# Patient Record
Sex: FEMALE | Race: WHITE | NOT HISPANIC OR LATINO | URBAN - METROPOLITAN AREA
[De-identification: names, ages, dates, MRNs, and addresses within clinical notes are randomized per-mention and may not be internally consistent; named-entity substitution may affect disease eponyms.]

---

## 2017-06-20 ENCOUNTER — INPATIENT (INPATIENT)
Facility: HOSPITAL | Age: 72
LOS: 8 days | Discharge: HOME | End: 2017-06-29
Attending: INTERNAL MEDICINE

## 2017-06-20 DIAGNOSIS — R20.9 UNSPECIFIED DISTURBANCES OF SKIN SENSATION: ICD-10-CM

## 2017-06-28 PROBLEM — Z00.00 ENCOUNTER FOR PREVENTIVE HEALTH EXAMINATION: Status: ACTIVE | Noted: 2017-06-28

## 2017-07-05 DIAGNOSIS — Z83.3 FAMILY HISTORY OF DIABETES MELLITUS: ICD-10-CM

## 2017-07-05 DIAGNOSIS — I97.89 OTHER POSTPROCEDURAL COMPLICATIONS AND DISORDERS OF THE CIRCULATORY SYSTEM, NOT ELSEWHERE CLASSIFIED: ICD-10-CM

## 2017-07-05 DIAGNOSIS — I49.5 SICK SINUS SYNDROME: ICD-10-CM

## 2017-07-05 DIAGNOSIS — Z88.0 ALLERGY STATUS TO PENICILLIN: ICD-10-CM

## 2017-07-05 DIAGNOSIS — I74.3 EMBOLISM AND THROMBOSIS OF ARTERIES OF THE LOWER EXTREMITIES: ICD-10-CM

## 2017-07-05 DIAGNOSIS — I48.0 PAROXYSMAL ATRIAL FIBRILLATION: ICD-10-CM

## 2017-07-05 DIAGNOSIS — E87.6 HYPOKALEMIA: ICD-10-CM
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
## 2017-07-05 DIAGNOSIS — Z96.651 PRESENCE OF RIGHT ARTIFICIAL KNEE JOINT: ICD-10-CM

## 2017-07-05 DIAGNOSIS — Z82.49 FAMILY HISTORY OF ISCHEMIC HEART DISEASE AND OTHER DISEASES OF THE CIRCULATORY SYSTEM: ICD-10-CM

## 2017-07-05 DIAGNOSIS — E78.00 PURE HYPERCHOLESTEROLEMIA, UNSPECIFIED: ICD-10-CM

## 2017-07-05 DIAGNOSIS — D62 ACUTE POSTHEMORRHAGIC ANEMIA: ICD-10-CM

## 2017-07-05 DIAGNOSIS — I51.3 INTRACARDIAC THROMBOSIS, NOT ELSEWHERE CLASSIFIED: ICD-10-CM

## 2017-07-05 DIAGNOSIS — Z88.1 ALLERGY STATUS TO OTHER ANTIBIOTIC AGENTS STATUS: ICD-10-CM

## 2017-07-05 DIAGNOSIS — E66.9 OBESITY, UNSPECIFIED: ICD-10-CM

## 2017-07-06 DIAGNOSIS — I97.191 OTHER POSTPROCEDURAL CARDIAC FUNCTIONAL DISTURBANCES FOLLOWING OTHER SURGERY: ICD-10-CM

## 2017-07-17 ENCOUNTER — APPOINTMENT (OUTPATIENT)
Dept: CARDIOLOGY | Facility: CLINIC | Age: 72
End: 2017-07-17

## 2017-07-17 VITALS
WEIGHT: 190 LBS | HEIGHT: 62 IN | BODY MASS INDEX: 34.96 KG/M2 | SYSTOLIC BLOOD PRESSURE: 130 MMHG | DIASTOLIC BLOOD PRESSURE: 72 MMHG

## 2017-07-17 DIAGNOSIS — I10 ESSENTIAL (PRIMARY) HYPERTENSION: ICD-10-CM

## 2017-07-17 DIAGNOSIS — F15.90 OTHER STIMULANT USE, UNSPECIFIED, UNCOMPLICATED: ICD-10-CM

## 2017-07-17 DIAGNOSIS — Z87.891 PERSONAL HISTORY OF NICOTINE DEPENDENCE: ICD-10-CM

## 2017-07-17 DIAGNOSIS — Z80.9 FAMILY HISTORY OF MALIGNANT NEOPLASM, UNSPECIFIED: ICD-10-CM

## 2017-07-17 DIAGNOSIS — I48.0 PAROXYSMAL ATRIAL FIBRILLATION: ICD-10-CM

## 2017-07-17 DIAGNOSIS — Z82.49 FAMILY HISTORY OF ISCHEMIC HEART DISEASE AND OTHER DISEASES OF THE CIRCULATORY SYSTEM: ICD-10-CM

## 2017-07-17 DIAGNOSIS — Z83.3 FAMILY HISTORY OF DIABETES MELLITUS: ICD-10-CM

## 2017-07-17 RX ORDER — DRONEDARONE 400 MG/1
400 TABLET, FILM COATED ORAL TWICE DAILY
Qty: 60 | Refills: 2 | Status: ACTIVE | COMMUNITY
Start: 2017-07-17 | End: 1900-01-01

## 2017-07-17 RX ORDER — AMIODARONE HYDROCHLORIDE 200 MG/1
200 TABLET ORAL
Refills: 0 | Status: DISCONTINUED | COMMUNITY
End: 2017-07-17

## 2017-07-17 RX ORDER — PANTOPRAZOLE 40 MG/1
40 TABLET, DELAYED RELEASE ORAL DAILY
Refills: 0 | Status: ACTIVE | COMMUNITY

## 2017-07-17 RX ORDER — TRIAMTERENE AND HYDROCHLOROTHIAZIDE 37.5; 25 MG/1; MG/1
37.5-25 CAPSULE ORAL DAILY
Refills: 0 | Status: ACTIVE | COMMUNITY

## 2017-07-17 RX ORDER — DILTIAZEM HYDROCHLORIDE 60 MG/1
60 TABLET ORAL TWICE DAILY
Refills: 0 | Status: ACTIVE | COMMUNITY

## 2017-07-17 RX ORDER — RIVAROXABAN 15 MG/1
15 TABLET, FILM COATED ORAL TWICE DAILY
Refills: 0 | Status: ACTIVE | COMMUNITY

## 2017-09-25 ENCOUNTER — APPOINTMENT (OUTPATIENT)
Dept: CARDIOLOGY | Facility: CLINIC | Age: 72
End: 2017-09-25

## 2018-01-14 ENCOUNTER — EMERGENCY (EMERGENCY)
Facility: HOSPITAL | Age: 73
LOS: 0 days | Discharge: HOME | End: 2018-01-14

## 2018-01-14 DIAGNOSIS — Z86.718 PERSONAL HISTORY OF OTHER VENOUS THROMBOSIS AND EMBOLISM: ICD-10-CM

## 2018-01-14 DIAGNOSIS — H55.00 UNSPECIFIED NYSTAGMUS: ICD-10-CM

## 2018-01-14 DIAGNOSIS — I48.91 UNSPECIFIED ATRIAL FIBRILLATION: ICD-10-CM

## 2018-01-14 DIAGNOSIS — Z79.899 OTHER LONG TERM (CURRENT) DRUG THERAPY: ICD-10-CM

## 2018-01-14 DIAGNOSIS — R42 DIZZINESS AND GIDDINESS: ICD-10-CM

## 2018-01-14 DIAGNOSIS — E78.00 PURE HYPERCHOLESTEROLEMIA, UNSPECIFIED: ICD-10-CM

## 2018-01-14 DIAGNOSIS — R20.9 UNSPECIFIED DISTURBANCES OF SKIN SENSATION: ICD-10-CM

## 2018-01-14 DIAGNOSIS — E86.0 DEHYDRATION: ICD-10-CM

## 2018-01-14 DIAGNOSIS — Z79.01 LONG TERM (CURRENT) USE OF ANTICOAGULANTS: ICD-10-CM

## 2018-05-20 ENCOUNTER — INPATIENT (INPATIENT)
Facility: HOSPITAL | Age: 73
LOS: 3 days | Discharge: HOME | End: 2018-05-24
Attending: INTERNAL MEDICINE | Admitting: INTERNAL MEDICINE

## 2018-05-20 VITALS
SYSTOLIC BLOOD PRESSURE: 181 MMHG | RESPIRATION RATE: 20 BRPM | HEART RATE: 57 BPM | OXYGEN SATURATION: 98 % | TEMPERATURE: 96 F | DIASTOLIC BLOOD PRESSURE: 75 MMHG

## 2018-05-20 DIAGNOSIS — Z98.890 OTHER SPECIFIED POSTPROCEDURAL STATES: Chronic | ICD-10-CM

## 2018-05-20 LAB
ALBUMIN SERPL ELPH-MCNC: 3.9 G/DL — SIGNIFICANT CHANGE UP (ref 3.5–5.2)
ALP SERPL-CCNC: 188 U/L — HIGH (ref 30–115)
ALT FLD-CCNC: 32 U/L — SIGNIFICANT CHANGE UP (ref 0–41)
ANION GAP SERPL CALC-SCNC: 17 MMOL/L — HIGH (ref 7–14)
APTT BLD: 34.1 SEC — SIGNIFICANT CHANGE UP (ref 27–39.2)
AST SERPL-CCNC: 30 U/L — SIGNIFICANT CHANGE UP (ref 0–41)
BASOPHILS # BLD AUTO: 0.03 K/UL — SIGNIFICANT CHANGE UP (ref 0–0.2)
BASOPHILS NFR BLD AUTO: 0.3 % — SIGNIFICANT CHANGE UP (ref 0–1)
BILIRUB SERPL-MCNC: 1.3 MG/DL — HIGH (ref 0.2–1.2)
BUN SERPL-MCNC: 23 MG/DL — HIGH (ref 10–20)
CALCIUM SERPL-MCNC: 8.5 MG/DL — SIGNIFICANT CHANGE UP (ref 8.5–10.1)
CHLORIDE SERPL-SCNC: 99 MMOL/L — SIGNIFICANT CHANGE UP (ref 98–110)
CK SERPL-CCNC: 114 U/L — SIGNIFICANT CHANGE UP (ref 0–225)
CK SERPL-CCNC: 74 U/L — SIGNIFICANT CHANGE UP (ref 0–225)
CO2 SERPL-SCNC: 25 MMOL/L — SIGNIFICANT CHANGE UP (ref 17–32)
CREAT SERPL-MCNC: 1.1 MG/DL — SIGNIFICANT CHANGE UP (ref 0.7–1.5)
EOSINOPHIL # BLD AUTO: 0.06 K/UL — SIGNIFICANT CHANGE UP (ref 0–0.7)
EOSINOPHIL NFR BLD AUTO: 0.7 % — SIGNIFICANT CHANGE UP (ref 0–8)
GLUCOSE SERPL-MCNC: 162 MG/DL — HIGH (ref 70–99)
HCT VFR BLD CALC: 36.2 % — LOW (ref 37–47)
HGB BLD-MCNC: 11.8 G/DL — LOW (ref 12–16)
IMM GRANULOCYTES NFR BLD AUTO: 0.4 % — HIGH (ref 0.1–0.3)
INR BLD: 3.25 RATIO — HIGH (ref 0.65–1.3)
LYMPHOCYTES # BLD AUTO: 0.87 K/UL — LOW (ref 1.2–3.4)
LYMPHOCYTES # BLD AUTO: 9.7 % — LOW (ref 20.5–51.1)
MCHC RBC-ENTMCNC: 30.2 PG — SIGNIFICANT CHANGE UP (ref 27–31)
MCHC RBC-ENTMCNC: 32.6 G/DL — SIGNIFICANT CHANGE UP (ref 32–37)
MCV RBC AUTO: 92.6 FL — SIGNIFICANT CHANGE UP (ref 81–99)
MONOCYTES # BLD AUTO: 0.5 K/UL — SIGNIFICANT CHANGE UP (ref 0.1–0.6)
MONOCYTES NFR BLD AUTO: 5.6 % — SIGNIFICANT CHANGE UP (ref 1.7–9.3)
NEUTROPHILS # BLD AUTO: 7.46 K/UL — HIGH (ref 1.4–6.5)
NEUTROPHILS NFR BLD AUTO: 83.3 % — HIGH (ref 42.2–75.2)
NRBC # BLD: 0 /100 WBCS — SIGNIFICANT CHANGE UP (ref 0–0)
PLATELET # BLD AUTO: 208 K/UL — SIGNIFICANT CHANGE UP (ref 130–400)
POTASSIUM SERPL-MCNC: 4.5 MMOL/L — SIGNIFICANT CHANGE UP (ref 3.5–5)
POTASSIUM SERPL-SCNC: 4.5 MMOL/L — SIGNIFICANT CHANGE UP (ref 3.5–5)
PROT SERPL-MCNC: 7.4 G/DL — SIGNIFICANT CHANGE UP (ref 6–8)
PROTHROM AB SERPL-ACNC: 35.9 SEC — HIGH (ref 9.95–12.87)
RBC # BLD: 3.91 M/UL — LOW (ref 4.2–5.4)
RBC # FLD: 15.1 % — HIGH (ref 11.5–14.5)
SODIUM SERPL-SCNC: 141 MMOL/L — SIGNIFICANT CHANGE UP (ref 135–146)
TROPONIN T SERPL-MCNC: 0.03 NG/ML — CRITICAL HIGH
TROPONIN T SERPL-MCNC: 0.04 NG/ML — CRITICAL HIGH
WBC # BLD: 8.96 K/UL — SIGNIFICANT CHANGE UP (ref 4.8–10.8)
WBC # FLD AUTO: 8.96 K/UL — SIGNIFICANT CHANGE UP (ref 4.8–10.8)

## 2018-05-20 RX ORDER — MECLIZINE HCL 12.5 MG
25 TABLET ORAL THREE TIMES A DAY
Qty: 0 | Refills: 0 | Status: DISCONTINUED | OUTPATIENT
Start: 2018-05-20 | End: 2018-05-24

## 2018-05-20 RX ORDER — METOCLOPRAMIDE HCL 10 MG
10 TABLET ORAL ONCE
Qty: 0 | Refills: 0 | Status: COMPLETED | OUTPATIENT
Start: 2018-05-20 | End: 2018-05-20

## 2018-05-20 RX ORDER — TRIAMTERENE 100 MG/1
0 CAPSULE ORAL
Qty: 0 | Refills: 0 | COMMUNITY

## 2018-05-20 RX ORDER — DRONEDARONE 400 MG/1
400 TABLET, FILM COATED ORAL
Qty: 0 | Refills: 0 | Status: DISCONTINUED | OUTPATIENT
Start: 2018-05-20 | End: 2018-05-24

## 2018-05-20 RX ORDER — MECLIZINE HCL 12.5 MG
25 TABLET ORAL ONCE
Qty: 0 | Refills: 0 | Status: COMPLETED | OUTPATIENT
Start: 2018-05-20 | End: 2018-05-20

## 2018-05-20 RX ORDER — RIVAROXABAN 15 MG-20MG
1 KIT ORAL
Qty: 0 | Refills: 0 | COMMUNITY

## 2018-05-20 RX ORDER — LEVOTHYROXINE SODIUM 125 MCG
1 TABLET ORAL
Qty: 0 | Refills: 0 | COMMUNITY

## 2018-05-20 RX ORDER — PANTOPRAZOLE SODIUM 20 MG/1
0 TABLET, DELAYED RELEASE ORAL
Qty: 0 | Refills: 0 | COMMUNITY

## 2018-05-20 RX ORDER — ASPIRIN/CALCIUM CARB/MAGNESIUM 324 MG
325 TABLET ORAL DAILY
Qty: 0 | Refills: 0 | Status: DISCONTINUED | OUTPATIENT
Start: 2018-05-20 | End: 2018-05-22

## 2018-05-20 RX ORDER — PANTOPRAZOLE SODIUM 20 MG/1
40 TABLET, DELAYED RELEASE ORAL DAILY
Qty: 0 | Refills: 0 | Status: DISCONTINUED | OUTPATIENT
Start: 2018-05-20 | End: 2018-05-22

## 2018-05-20 RX ORDER — LEVOTHYROXINE SODIUM 125 MCG
100 TABLET ORAL DAILY
Qty: 0 | Refills: 0 | Status: DISCONTINUED | OUTPATIENT
Start: 2018-05-20 | End: 2018-05-24

## 2018-05-20 RX ORDER — METOCLOPRAMIDE HCL 10 MG
5 TABLET ORAL EVERY 8 HOURS
Qty: 0 | Refills: 0 | Status: DISCONTINUED | OUTPATIENT
Start: 2018-05-20 | End: 2018-05-22

## 2018-05-20 RX ORDER — RIVAROXABAN 15 MG-20MG
20 KIT ORAL EVERY 24 HOURS
Qty: 0 | Refills: 0 | Status: DISCONTINUED | OUTPATIENT
Start: 2018-05-20 | End: 2018-05-24

## 2018-05-20 RX ORDER — ATORVASTATIN CALCIUM 80 MG/1
40 TABLET, FILM COATED ORAL AT BEDTIME
Qty: 0 | Refills: 0 | Status: DISCONTINUED | OUTPATIENT
Start: 2018-05-20 | End: 2018-05-24

## 2018-05-20 RX ORDER — PANTOPRAZOLE SODIUM 20 MG/1
40 TABLET, DELAYED RELEASE ORAL
Qty: 0 | Refills: 0 | COMMUNITY

## 2018-05-20 RX ORDER — DRONEDARONE 400 MG/1
1 TABLET, FILM COATED ORAL
Qty: 0 | Refills: 0 | COMMUNITY

## 2018-05-20 RX ORDER — ATORVASTATIN CALCIUM 80 MG/1
0 TABLET, FILM COATED ORAL
Qty: 0 | Refills: 0 | COMMUNITY

## 2018-05-20 RX ORDER — ONDANSETRON 8 MG/1
4 TABLET, FILM COATED ORAL ONCE
Qty: 0 | Refills: 0 | Status: COMPLETED | OUTPATIENT
Start: 2018-05-20 | End: 2018-05-20

## 2018-05-20 RX ORDER — TRIAMTERENE/HYDROCHLOROTHIAZID 75 MG-50MG
1 TABLET ORAL DAILY
Qty: 0 | Refills: 0 | Status: DISCONTINUED | OUTPATIENT
Start: 2018-05-20 | End: 2018-05-24

## 2018-05-20 RX ADMIN — ONDANSETRON 4 MILLIGRAM(S): 8 TABLET, FILM COATED ORAL at 10:10

## 2018-05-20 RX ADMIN — DRONEDARONE 400 MILLIGRAM(S): 400 TABLET, FILM COATED ORAL at 20:30

## 2018-05-20 RX ADMIN — RIVAROXABAN 20 MILLIGRAM(S): KIT at 20:30

## 2018-05-20 RX ADMIN — ATORVASTATIN CALCIUM 40 MILLIGRAM(S): 80 TABLET, FILM COATED ORAL at 22:52

## 2018-05-20 RX ADMIN — Medication 325 MILLIGRAM(S): at 12:55

## 2018-05-20 RX ADMIN — Medication 10 MILLIGRAM(S): at 17:46

## 2018-05-20 RX ADMIN — Medication 25 MILLIGRAM(S): at 09:03

## 2018-05-20 NOTE — ED PROVIDER NOTE - NS ED ROS FT
Constitutional: No fever, chills, unintended weight loss.  Eyes:  No visual changes, eye pain or discharge.  ENMT:  No hearing changes, pain, no sore throat or runny nose, no difficulty swallowing  Cardiac:  No chest pain, SOB or edema. No chest pain with exertion.  Respiratory:  No cough or respiratory distress. No hemoptysis. No history of asthma or RAD.  GI:  No nausea, vomiting, diarrhea or abdominal pain.  :  No dysuria, frequency or burning.  MS:  No myalgia, muscle weakness, joint pain or back pain.  Neuro:  see hpi; no headache or weakness.  No LOC.  Skin:  No skin rash.   Endocrine: +hypothyroidism,  no diabetes.

## 2018-05-20 NOTE — ED PROVIDER NOTE - CARE PLAN
Principal Discharge DX:	Dizziness  Secondary Diagnosis:	Bradycardia  Secondary Diagnosis:	Elevated troponin

## 2018-05-20 NOTE — ED PROVIDER NOTE - PHYSICAL EXAMINATION
VITAL SIGNS: I have reviewed nursing notes and confirm.  CONSTITUTIONAL: Well-developed; well-nourished; in no acute distress.  SKIN: Skin exam is warm and dry, no acute rash.  HEAD: Normocephalic; atraumatic.  EYES: PERRL, EOM intact; conjunctiva and sclera clear.  ENT: No nasal discharge; airway clear.  NECK: Supple; non tender. No bruits.  CARD: S1, S2 normal; no murmurs, gallops, or rubs. Regular rate and rhythm.  RESP: No wheezes, rales or rhonchi.  ABD: Normal bowel sounds; soft; non-distended; non-tender; no hepatosplenomegaly.  EXT: Normal ROM. No clubbing, cyanosis or edema.  NEURO: aaox3, cn 2-12 intact bl no nystagmus or facial droop, 5/5 strength x 4 no drift, gross sensation intact, finger-nose nl, subj dizziness illicited w/head turning & standing, unable to assess gait/romberg due to current sx  PSYCH: Cooperative, appropriate.

## 2018-05-20 NOTE — H&P ADULT - ATTENDING COMMENTS
Patient seen and examined independently. Resident's H & P reviewed. Agree with the findings and plan of care except,    A 73 years old female presented with vertigo. Pt. states that it gets worse by moving the head. It feels like that room is spinning around her. Pt's care D/W his PMD. He mentioned that pt was recently started on synthyroid 100mg once a day a few weeks ago.    Brain MRI: Ch. small vessel ischemic changes. EKG showed bradycardia    ASSESSMENT:    1. BPV  2. PAF S/P recent ablation  3. CKD-III  4. Slightly elevated troponins due to #3    . Metoprolol dose decreased  . Monitor on tele for 24 hrs  . David brown  . Can try Meclizine PRN.   . Anticipate D/C in AM Patient seen and examined independently. Resident's H & P reviewed. Agree with the findings and plan of care except,    A 73 years old female presented with vertigo. Pt. states that it gets worse by moving the head. It feels like that room is spinning around her. Pt's care D/W his PMD. He mentioned that pt was recently started on synthyroid 100mg once a day a few weeks ago.    Brain MRI: Ch. small vessel ischemic changes. EKG showed bradycardia    ASSESSMENT:    1. BPV  2. PAF S/P recent ablation  3. CKD-III  4. Slightly elevated troponins due to #3    . Hold Metoprolol  . Monitor on tele for 24 hrs  . David brown  . Can try Meclizine PRN.   . Anticipate D/C in AM

## 2018-05-20 NOTE — H&P ADULT - ASSESSMENT
73 y.o F w/ hx A Fib s/p ablation at [facility] on 5/7/18 (on xarelto, multaq, metoprolol, lasix), HTN, DL, hypothyroidism, p/w vertigo of 1 dd.    1- vertigo: peripheral vs. central  admit to telemetry  check MRI brain  2D echo  c/w ASA, xarelto and lipitor  neuro checks   meclizine + zofran PRN for vertigo  neuro following - will f/u recs    2- a. fib s/p ablation: pt in sinus rythm  will hol off metoprolol as pt was bradycardic  c/w multaq for now - if pt remains bradycardic, consider holding  c/w xarelto    3- HTN / DL / hypothyroidism: c/w home meds    4- GI ppx: protonix  DVT ppx: on xarelto  full code 73 y.o F w/ hx A Fib s/p ablation at [facility] on 5/7/18 (on xarelto, multaq, metoprolol, lasix), HTN, DL, hypothyroidism, p/w vertigo of 1 dd.    1- vertigo: peripheral vs. central  admit to telemetry  check MRI brain  2D echo  c/w ASA, xarelto and lipitor  neuro checks   meclizine + zofran PRN for vertigo  neuro following - will f/u recs    2- a. fib s/p ablation: pt in sinus rythm  will hol off metoprolol as pt was bradycardic  c/w multaq for now - if pt remains bradycardic, consider holding  c/w xarelto    3- + troponins: no signs and symptoms of ACS  follow up repeat CE at 4 pm - serial EKG    4- HTN / DL / hypothyroidism: c/w home meds    5- GI ppx: protonix  DVT ppx: on xarelto  full code

## 2018-05-20 NOTE — ED PROVIDER NOTE - PROGRESS NOTE DETAILS
d/w Neuro Dr. Elliot Garcia, awaiting CT head results and reassessment for final dispo pt back from CT, still nauseous & dizzy w/1 episode of nbnb vomiting in ED - Tn 0.04 - case d/w Cardio fellow Dr. Betancourt, no need for CCU @ this point, rec tele admission & trending of Tns, will follow endorsed to JASPREET Garcia, will f/u final recs from Neuro Dr. Chavez Dr. Elliot Garcia updated on CT findings, will follow and make final recs to inpatient med team

## 2018-05-20 NOTE — H&P ADULT - PMH
Afib    High cholesterol    Hypothyroid Afib    High cholesterol    HTN (hypertension)    Hypothyroid

## 2018-05-20 NOTE — CONSULT NOTE ADULT - SUBJECTIVE AND OBJECTIVE BOX
TEE MATUTE     73y     Female    MRN-[id number]                                                           CC:Patient is a 73y old  Female who presents with a chief complaint of     HPI:    From ED provider note:  · HPI Objective Statement: 73y f h/o AF s/p ablation @ [facility] 5/7/18 on xarelto, multaq, metoprolol, lasix, htn, hl, hypothyroidism p/w dizziness since 6am. Pt woke up in bed w/sensation of room spinning, worse w/mvmt & lying back, accomp by nausea. Went to bed @ 10p yest in nl soh. Denies ha, vision changes, neck pain, cp/sob, palpitations, cough, hemoptysis, v/d, abd pain, FNDs. PMD/Cardio in  - Dr. Lyn.	      FAMILY HISTORY:      Vital Signs Last 24 Hrs  T(C): 36.7 (20 May 2018 11:13), Max: 36.7 (20 May 2018 11:13)  T(F): 98 (20 May 2018 11:13), Max: 98 (20 May 2018 11:13)  HR: 47 (20 May 2018 11:13) (47 - 57)  BP: 151/69 (20 May 2018 11:13) (151/69 - 181/75)  BP(mean): --  RR: 18 (20 May 2018 11:13) (18 - 20)  SpO2: 97% (20 May 2018 11:13) (97% - 98%)        Neuro Exam:  Orientation: oriented to person, oriented to place and oriented to time.   Attention: normal concentrating ability and visual attention was not decreased.   Language: no difficulty naming common objects, no difficulty repeating a phrase, no difficulty writing a sentence, fluency intact, comprehension intact and reading intact.   Fund of knowledge: displays adequate knowledge of personal past history.   Cranial Nerves: visual acuity intact bilaterally, visual fields full to confrontation, pupils equal round and reactive to light, extraocular motion intact, facial sensation intact symmetrically, face symmetrical, hearing was intact bilaterally, tongue and palate midline, head turning and shoulder shrug symmetric and there was no tongue deviation with protrusion.   Motor: muscle tone was normal in all four extremities, muscle strength was normal in all four extremities and normal bulk in all four extremities.   Sensory exam: light touch was intact.   Coordination:. normal gait. balance was intact. there was no past-pointing. no tremor present.   Deep tendon reflexes:   Biceps right 2+. Biceps left 2+.    Triceps right 2+. Triceps left 2+.  LOC  Brachioradialis right 2+. Brachioradialis left 2+.    Patella right 2+. Patella left 2+.    Ankle jerk right 2+. Ankle jerk left 2+.   Plantar responses normal on the right, normal on the left.      NIHSS:     Allergies    amoxicillin (Unknown)  penicillin (Unknown)    Intolerances       Home Medications:  atorvastatin 40 mg oral tablet:  (20 May 2018 08:25)  furosemide 20 mg oral tablet:  (20 May 2018 08:25)  metoprolol succinate 25 mg oral tablet, extended release: 1 tab(s) orally once a day (20 May 2018 08:25)  Multaq 400 mg oral tablet: 1 tab(s) orally 2 times a day (20 May 2018 08:25)  pantoprazole:  (20 May 2018 08:25)  Synthroid 100 mcg (0.1 mg) oral tablet: 1 tab(s) orally once a day (20 May 2018 08:25)  triamterene:  (20 May 2018 08:25)  Xarelto 20 mg oral tablet: 1 tab(s) orally once a day (in the evening) (20 May 2018 08:25)      MEDICATIONS  (STANDING):  aspirin 325 milliGRAM(s) Oral daily  metoclopramide Injectable 10 milliGRAM(s) IV Push once    MEDICATIONS  (PRN):      LABS:                        11.8   8.96  )-----------( 208      ( 20 May 2018 08:08 )             36.2     05-20    141  |  99  |  23<H>  ----------------------------<  162<H>  4.5   |  25  |  1.1    Ca    8.5      20 May 2018 08:08    TPro  7.4  /  Alb  3.9  /  TBili  1.3<H>  /  DBili  x   /  AST  30  /  ALT  32  /  AlkPhos  188<H>  05-20    PT/INR - ( 20 May 2018 08:08 )   PT: 35.90 sec;   INR: 3.25 ratio         PTT - ( 20 May 2018 08:08 )  PTT:34.1 sec        Neuro Imaging:< from: CT Head No Cont (05.20.18 @ 10:54) >  INTERPRETATION:  Clinical History / Reason for exam: Dizziness.    TECHNIQUE: Contiguous axial CT images of the head were obtained from the   base of the skull to the vertex without IV contrast. Sagittal and coronal   reformats were obtained.     COMPARISON: None.    FINDINGS:    The cortical sulci and ventricular system demonstrate parenchymal volume   loss.     No acute intracranial hemorrhage, mass effect, midline shift, or   extra-axial fluid collection.    Gray-white differentiation is maintained.     Beam hardening artifact isnoted overlying the brain stem and posterior   fossa which is inherent to CT in this location.    The paranasal sinuses and mastoid air cells are well aerated.      IMPRESSION:    No CT evidence of acute intracranial pathology.      < end of copied text >        Assessment / Plan:  Incomplete  Note to follow TEE MATUTE     73y     Female    [id number]                                                           CC:Patient is a 73y old  Female who presents with a chief complaint of     HPI: 72yo woman presenting with vertigo upon waking this AM.  Vertigo is worse when she moves in any direction and better if she stays still.  Even when not moving she still has slight sensation of movement.  No numbness, weakness, diplopia, dysphagia, dysarthria.    From ED provider note:  · HPI Objective Statement: 73y f h/o AF s/p ablation @ [facility] 5/7/18 on xarelto, multaq, metoprolol, lasix, htn, hl, hypothyroidism p/w dizziness since 6am. Pt woke up in bed w/sensation of room spinning, worse w/mvmt & lying back, accomp by nausea. Went to bed @ 10p yest in nl soh. Denies ha, vision changes, neck pain, cp/sob, palpitations, cough, hemoptysis, v/d, abd pain, FNDs. PMD/Cardio in  - Dr. Lyn.	      FAMILY HISTORY:      Vital Signs Last 24 Hrs  T(C): 36.7 (20 May 2018 11:13), Max: 36.7 (20 May 2018 11:13)  T(F): 98 (20 May 2018 11:13), Max: 98 (20 May 2018 11:13)  HR: 47 (20 May 2018 11:13) (47 - 57)  BP: 151/69 (20 May 2018 11:13) (151/69 - 181/75)  BP(mean): --  RR: 18 (20 May 2018 11:13) (18 - 20)  SpO2: 97% (20 May 2018 11:13) (97% - 98%)        Neuro Exam:  Orientation: oriented to person, oriented to place and oriented to time.   Attention: normal concentrating ability and visual attention was not decreased.   Language: no difficulty naming common objects, no difficulty repeating a phrase, no difficulty writing a sentence, fluency intact, comprehension intact and reading intact.   Fund of knowledge: displays adequate knowledge of personal past history.   Cranial Nerves: visual acuity intact bilaterally, visual fields full to confrontation, pupils equal round and reactive to light, extraocular motion intact, facial sensation intact symmetrically, face symmetrical, hearing was intact bilaterally, tongue and palate midline, head turning and shoulder shrug symmetric and there was no tongue deviation with protrusion.   Motor: muscle tone was normal in all four extremities, muscle strength was normal in all four extremities and normal bulk in all four extremities.   Sensory exam: light touch was intact.   Coordination:. normal gait. balance was intact. there was no past-pointing. no tremor present.   Deep tendon reflexes:   1+throughout; plantar down b/l    NIHSS:     Allergies    amoxicillin (Unknown)  penicillin (Unknown)    Intolerances       Home Medications:  atorvastatin 40 mg oral tablet:  (20 May 2018 08:25)  furosemide 20 mg oral tablet:  (20 May 2018 08:25)  metoprolol succinate 25 mg oral tablet, extended release: 1 tab(s) orally once a day (20 May 2018 08:25)  Multaq 400 mg oral tablet: 1 tab(s) orally 2 times a day (20 May 2018 08:25)  pantoprazole:  (20 May 2018 08:25)  Synthroid 100 mcg (0.1 mg) oral tablet: 1 tab(s) orally once a day (20 May 2018 08:25)  triamterene:  (20 May 2018 08:25)  Xarelto 20 mg oral tablet: 1 tab(s) orally once a day (in the evening) (20 May 2018 08:25)      MEDICATIONS  (STANDING):  aspirin 325 milliGRAM(s) Oral daily  metoclopramide Injectable 10 milliGRAM(s) IV Push once    MEDICATIONS  (PRN):      LABS:                        11.8   8.96  )-----------( 208      ( 20 May 2018 08:08 )             36.2     05-20    141  |  99  |  23<H>  ----------------------------<  162<H>  4.5   |  25  |  1.1    Ca    8.5      20 May 2018 08:08    TPro  7.4  /  Alb  3.9  /  TBili  1.3<H>  /  DBili  x   /  AST  30  /  ALT  32  /  AlkPhos  188<H>  05-20    PT/INR - ( 20 May 2018 08:08 )   PT: 35.90 sec;   INR: 3.25 ratio         PTT - ( 20 May 2018 08:08 )  PTT:34.1 sec        Neuro Imaging:< from: CT Head No Cont (05.20.18 @ 10:54) >  INTERPRETATION:  Clinical History / Reason for exam: Dizziness.    TECHNIQUE: Contiguous axial CT images of the head were obtained from the   base of the skull to the vertex without IV contrast. Sagittal and coronal   reformats were obtained.     COMPARISON: None.    FINDINGS:    The cortical sulci and ventricular system demonstrate parenchymal volume   loss.     No acute intracranial hemorrhage, mass effect, midline shift, or   extra-axial fluid collection.    Gray-white differentiation is maintained.     Beam hardening artifact isnoted overlying the brain stem and posterior   fossa which is inherent to CT in this location.    The paranasal sinuses and mastoid air cells are well aerated.      IMPRESSION:    No CT evidence of acute intracranial pathology.      < end of copied text >        Assessment / Plan:  Patient presenting with vertigo which is worse with movement but still present slightly at rest.  DDx peripheral vertigo however given cardiac history will need ot rule out central causes for vertigo  1. MRI brain w/o RADHA (if negative then no further neuro workup)  2. Call with questions

## 2018-05-20 NOTE — H&P ADULT - HISTORY OF PRESENT ILLNESS
73y f h/o AF s/p ablation @ [facility] 5/7/18 on xarelto, multaq, metoprolol, lasix, htn, hl, hypothyroidism p/w dizziness since 6am. Pt woke up in bed w/sensation of room spinning, worse w/mvmt & lying back, accomp by nausea. Went to bed @ 10p yest in nl soh. Denies ha, vision changes, neck pain, cp/sob, palpitations, cough, hemoptysis, v/d, abd pain, FNDs.     in ED:  / meclizine 25 PO / zofran 4 iv / reglan 10 iv 73 y.o F w/ hx A Fib s/p ablation at [facility] on 5/7/18 (on xarelto, multaq, metoprolol, lasix), HTN, DL, hypothyroidism, p/w vertigo that started when waking up at 6 am.   pt felt that the room was spinning and her symptoms were worsening with movement, she had nausea and vomiting x 1.   pt denies any motor or sensory deficits, no gait abnormalities, no palpitations, no HA, no visual or auditory changes, no SOB, no CP.    in ED:  / meclizine 25 PO / zofran 4 iv / reglan 10 iv

## 2018-05-20 NOTE — H&P ADULT - NSHPPHYSICALEXAM_GEN_ALL_CORE
T(C): 36.7 (05-20-18 @ 11:13), Max: 36.7 (05-20-18 @ 11:13)  HR: 47 (05-20-18 @ 11:13) (47 - 57)  BP: 151/69 (05-20-18 @ 11:13) (151/69 - 181/75)  RR: 18 (05-20-18 @ 11:13) (18 - 20)  SpO2: 97% (05-20-18 @ 11:13) (97% - 98%)    PHYSICAL EXAM:    GENERAL: NAD, well-developed  HEAD:  Atraumatic, Normocephalic  EYES: EOMI, PERRLA, conjunctiva and sclera clear  NECK: Supple, No JVD  CHEST/LUNG: Clear to auscultation bilaterally; No wheeze  HEART: Regular rate and rhythm; No murmurs, rubs, or gallops  ABDOMEN: Soft, Nontender, Nondistended; Bowel sounds present  EXTREMITIES:  2+ Peripheral Pulses, No clubbing, cyanosis, or edema  PSYCH: AAOx3  NEUROLOGY: non-focal  SKIN: No rashes or lesions T(C): 36.7 (05-20-18 @ 11:13), Max: 36.7 (05-20-18 @ 11:13)  HR: 47 (05-20-18 @ 11:13) (47 - 57)  BP: 151/69 (05-20-18 @ 11:13) (151/69 - 181/75)  RR: 18 (05-20-18 @ 11:13) (18 - 20)  SpO2: 97% (05-20-18 @ 11:13) (97% - 98%)    PHYSICAL EXAM:    GENERAL: NAD, well-developed  HEAD:  Atraumatic, Normocephalic  EYES: EOMI, PERRLA, conjunctiva and sclera clear  CHEST/LUNG: Clear to auscultation bilaterally; No wheeze  HEART: Regular rate and rhythm; No murmurs, rubs, or gallops  ABDOMEN: Soft, Nontender, Nondistended; Bowel sounds present  EXTREMITIES:  2+ Peripheral Pulses, No clubbing, cyanosis, or edema  PSYCH: AAOx3  NEUROLOGY: non-focal, no sensory or motor deficits, 5/5 all extremities, could not complete jennifer hallpike as patient had vertigo T(C): 36.7 (05-20-18 @ 11:13), Max: 36.7 (05-20-18 @ 11:13)  HR: 47 (05-20-18 @ 11:13) (47 - 57)  BP: 151/69 (05-20-18 @ 11:13) (151/69 - 181/75)  RR: 18 (05-20-18 @ 11:13) (18 - 20)  SpO2: 97% (05-20-18 @ 11:13) (97% - 98%)    PHYSICAL EXAM:    GENERAL: NAD, well-developed  HEAD:  Atraumatic, Normocephalic  EYES: EOMI, PERRLA, conjunctiva and sclera clear  CHEST/LUNG: Clear to auscultation bilaterally; No wheeze  HEART/CVS: Regular rate and rhythm; No murmurs, rubs, or gallops  ABDOMEN: Soft, Nontender, Nondistended; Bowel sounds present  EXTREMITIES:  2+ Peripheral Pulses, No clubbing, cyanosis, or edema  PSYCH: AAOx3  NEUROLOGY: non-focal, no sensory or motor deficits, 5/5 all extremities, could not complete jennifer hallpike as patient had vertigo

## 2018-05-20 NOTE — H&P ADULT - NSHPLABSRESULTS_GEN_ALL_CORE
11.8   8.96  )-----------( 208      ( 20 May 2018 08:08 )             36.2       05-20    141  |  99  |  23<H>  ----------------------------<  162<H>  4.5   |  25  |  1.1    Ca    8.5      20 May 2018 08:08    TPro  7.4  /  Alb  3.9  /  TBili  1.3<H>  /  DBili  x   /  AST  30  /  ALT  32  /  AlkPhos  188<H>  05-20          PT/INR - ( 20 May 2018 08:08 )   PT: 35.90 sec;   INR: 3.25 ratio       PTT - ( 20 May 2018 08:08 )  PTT:34.1 sec    CARDIAC MARKERS ( 20 May 2018 08:08 )  x     / 0.04 ng/mL / 114 U/L / x     / x          < from: CT Head No Cont (05.20.18 @ 10:54) >  No CT evidence of acute intracranial pathology.  ******PRELIMINARY REPORT******

## 2018-05-20 NOTE — ED ADULT NURSE REASSESSMENT NOTE - NS ED NURSE REASSESS COMMENT FT1
Pt resting at this time. HR In the low 40s. Pt assymptomatic. no reports of chest pain made. MD made aware. vital signs taken. and pacing pads placed on patient. no new orders placed at this time. will continue to monitor.

## 2018-05-21 LAB
ANION GAP SERPL CALC-SCNC: 13 MMOL/L — SIGNIFICANT CHANGE UP (ref 7–14)
BASOPHILS # BLD AUTO: 0.03 K/UL — SIGNIFICANT CHANGE UP (ref 0–0.2)
BASOPHILS NFR BLD AUTO: 0.3 % — SIGNIFICANT CHANGE UP (ref 0–1)
BUN SERPL-MCNC: 17 MG/DL — SIGNIFICANT CHANGE UP (ref 10–20)
CALCIUM SERPL-MCNC: 8.2 MG/DL — LOW (ref 8.5–10.1)
CHLORIDE SERPL-SCNC: 104 MMOL/L — SIGNIFICANT CHANGE UP (ref 98–110)
CO2 SERPL-SCNC: 27 MMOL/L — SIGNIFICANT CHANGE UP (ref 17–32)
CREAT SERPL-MCNC: 1.1 MG/DL — SIGNIFICANT CHANGE UP (ref 0.7–1.5)
EOSINOPHIL # BLD AUTO: 0.18 K/UL — SIGNIFICANT CHANGE UP (ref 0–0.7)
EOSINOPHIL NFR BLD AUTO: 2 % — SIGNIFICANT CHANGE UP (ref 0–8)
GLUCOSE SERPL-MCNC: 92 MG/DL — SIGNIFICANT CHANGE UP (ref 70–99)
HCT VFR BLD CALC: 35.2 % — LOW (ref 37–47)
HGB BLD-MCNC: 11.4 G/DL — LOW (ref 12–16)
IMM GRANULOCYTES NFR BLD AUTO: 0.4 % — HIGH (ref 0.1–0.3)
LYMPHOCYTES # BLD AUTO: 1.28 K/UL — SIGNIFICANT CHANGE UP (ref 1.2–3.4)
LYMPHOCYTES # BLD AUTO: 14.2 % — LOW (ref 20.5–51.1)
MAGNESIUM SERPL-MCNC: 2.2 MG/DL — SIGNIFICANT CHANGE UP (ref 1.8–2.4)
MCHC RBC-ENTMCNC: 30.2 PG — SIGNIFICANT CHANGE UP (ref 27–31)
MCHC RBC-ENTMCNC: 32.4 G/DL — SIGNIFICANT CHANGE UP (ref 32–37)
MCV RBC AUTO: 93.1 FL — SIGNIFICANT CHANGE UP (ref 81–99)
MONOCYTES # BLD AUTO: 0.62 K/UL — HIGH (ref 0.1–0.6)
MONOCYTES NFR BLD AUTO: 6.9 % — SIGNIFICANT CHANGE UP (ref 1.7–9.3)
NEUTROPHILS # BLD AUTO: 6.88 K/UL — HIGH (ref 1.4–6.5)
NEUTROPHILS NFR BLD AUTO: 76.2 % — HIGH (ref 42.2–75.2)
PLATELET # BLD AUTO: 204 K/UL — SIGNIFICANT CHANGE UP (ref 130–400)
POTASSIUM SERPL-MCNC: 3.4 MMOL/L — LOW (ref 3.5–5)
POTASSIUM SERPL-SCNC: 3.4 MMOL/L — LOW (ref 3.5–5)
RBC # BLD: 3.78 M/UL — LOW (ref 4.2–5.4)
RBC # FLD: 15.3 % — HIGH (ref 11.5–14.5)
SODIUM SERPL-SCNC: 144 MMOL/L — SIGNIFICANT CHANGE UP (ref 135–146)
WBC # BLD: 9.03 K/UL — SIGNIFICANT CHANGE UP (ref 4.8–10.8)
WBC # FLD AUTO: 9.03 K/UL — SIGNIFICANT CHANGE UP (ref 4.8–10.8)

## 2018-05-21 RX ADMIN — DRONEDARONE 400 MILLIGRAM(S): 400 TABLET, FILM COATED ORAL at 07:21

## 2018-05-21 RX ADMIN — DRONEDARONE 400 MILLIGRAM(S): 400 TABLET, FILM COATED ORAL at 18:00

## 2018-05-21 RX ADMIN — Medication 325 MILLIGRAM(S): at 11:17

## 2018-05-21 RX ADMIN — RIVAROXABAN 20 MILLIGRAM(S): KIT at 18:00

## 2018-05-21 RX ADMIN — PANTOPRAZOLE SODIUM 40 MILLIGRAM(S): 20 TABLET, DELAYED RELEASE ORAL at 11:17

## 2018-05-21 RX ADMIN — ATORVASTATIN CALCIUM 40 MILLIGRAM(S): 80 TABLET, FILM COATED ORAL at 22:48

## 2018-05-21 RX ADMIN — Medication 100 MICROGRAM(S): at 07:21

## 2018-05-21 RX ADMIN — Medication 1 CAPSULE(S): at 07:25

## 2018-05-22 RX ADMIN — ATORVASTATIN CALCIUM 40 MILLIGRAM(S): 80 TABLET, FILM COATED ORAL at 21:50

## 2018-05-22 RX ADMIN — DRONEDARONE 400 MILLIGRAM(S): 400 TABLET, FILM COATED ORAL at 06:55

## 2018-05-22 RX ADMIN — DRONEDARONE 400 MILLIGRAM(S): 400 TABLET, FILM COATED ORAL at 18:07

## 2018-05-22 RX ADMIN — Medication 325 MILLIGRAM(S): at 12:57

## 2018-05-22 RX ADMIN — RIVAROXABAN 20 MILLIGRAM(S): KIT at 18:07

## 2018-05-22 RX ADMIN — Medication 1 CAPSULE(S): at 06:56

## 2018-05-22 RX ADMIN — PANTOPRAZOLE SODIUM 40 MILLIGRAM(S): 20 TABLET, DELAYED RELEASE ORAL at 12:57

## 2018-05-22 RX ADMIN — Medication 100 MICROGRAM(S): at 06:55

## 2018-05-22 NOTE — PROGRESS NOTE ADULT - SUBJECTIVE AND OBJECTIVE BOX
SUBJECTIVE:    pt reports that her dizziness and nausea has resolved but she feels little wobbly when walking. She also has stairs at home    PAST MEDICAL & SURGICAL HISTORY  HTN (hypertension)  Hypothyroid  High cholesterol  Afib  S/P ablation of atrial fibrillation    SOCIAL HISTORY:  Negative for smoking/alcohol/drug use.     ALLERGIES:  amoxicillin (Unknown)  penicillin (Unknown)    MEDICATIONS:  STANDING MEDICATIONS  aspirin 325 milliGRAM(s) Oral daily  atorvastatin 40 milliGRAM(s) Oral at bedtime  dronedarone 400 milliGRAM(s) Oral two times a day  levothyroxine 100 MICROGram(s) Oral daily  pantoprazole   Suspension 40 milliGRAM(s) Oral daily  rivaroxaban 20 milliGRAM(s) Oral every 24 hours  triamterene 37.5 mG/hydrochlorothiazide 25 mG Capsule 1 Capsule(s) Oral daily    PRN MEDICATIONS  meclizine 25 milliGRAM(s) Oral three times a day PRN  metoclopramide Injectable 5 milliGRAM(s) IV Push every 8 hours PRN    VITALS:   T(F): 96.9  HR: 48  BP: 120/59  RR: 18  SpO2: 96%    LABS:                        11.4   9.03  )-----------( 204      ( 21 May 2018 07:17 )             35.2     05-21    144  |  104  |  17  ----------------------------<  92  3.4<L>   |  27  |  1.1    Ca    8.2<L>      21 May 2018 07:17  Mg     2.2     05-21                CARDIAC MARKERS ( 20 May 2018 16:27 )  x     / 0.03 ng/mL / 74 U/L / x     / x          RADIOLOGY:    PHYSICAL EXAM:  GEN: No acute distress  LUNGS: Clear to auscultation bilaterally   HEART: S1/S2 present. RRR.   ABD: Soft, non-tender, non-distended. Bowel sounds present  EXT: NC/NC/NE/2+PP/LOFTON  NEURO: AAOX3

## 2018-05-22 NOTE — PROGRESS NOTE ADULT - ASSESSMENT
73 y.o F w/ hx A Fib s/p ablation at [facility] on 5/7/18 (on xarelto, multaq, metoprolol, lasix), HTN, DL, hypothyroidism, p/w vertigo of 1 dd.    # vertigo likely BPPV  - MRI brain chronic small vascular changes only  - echo unremarkable  -c/wmeclizine prn    # a. fib s/p ablation  - on tele-->sinus bradycardia--> BB held  - cardio eval pending  - c/w multaq for now - if pt remains bradycardic, consider holding, c/w xarelto  - check TSH level    # HTN / DL / hypothyroidism: c/w home meds  # DVT ppx: on xarelto  # full code    # Disposition plan to home-->may require home care services as pt feels wobbly when walking around and has stairs at home--> PT evaluation  ---> a/c for d/c home tomorrow likely w/ [facility] after PT evaluation

## 2018-05-22 NOTE — PROGRESS NOTE ADULT - ASSESSMENT
73y female with h/o AF s/p ablation @ [facility] 5/7/18 on xarelto, multaq, metoprolol, lasix, htn, hypothyroidism p/w positional dizziness. Continues to report symptoms. MRI brain negative for acute findings. No focal deficits on exam.    Neuro attending note to follow

## 2018-05-22 NOTE — PROGRESS NOTE ADULT - SUBJECTIVE AND OBJECTIVE BOX
Neurology Follow up note  Patient seen and examined at bedside continues to report positional vertigo. Denies nausea vomiting headache vision changes or focal weakness      Vital Signs Last 24 Hrs  T(C): 36.1 (22 May 2018 00:29), Max: 36.5 (21 May 2018 15:43)  T(F): 97 (22 May 2018 00:29), Max: 97.7 (21 May 2018 15:43)  HR: 66 (22 May 2018 00:29) (47 - 448)  BP: 122/58 (22 May 2018 00:29) (111/52 - 174/61)  BP(mean): --  RR: 18 (22 May 2018 00:29) (17 - 18)  SpO2: 98% (22 May 2018 00:29) (96% - 99%)          Neurological Exam:   Mental status: Awake, alert and oriented x3.    Cranial nerves: Fundoscopic exam demonstrated no abnormalities, pupils equally round and reactive to light, visual fields full, no nystagmus, extraocular muscles intact, V1 through V3 intact bilaterally and symmetric, face symmetric, hearing intact to finger rub, palate elevation symmetric, tongue was midline, sternocleidomastoid/shoulder shrug strength bilaterally 5/5.    Motor:  5/5 throughout  Sensation: Intact to light touch, proprioception, and pinprick.  No neglect.   Coordination: No dysmetria on finger-to-nose and heel-to-shin.  No clumsiness.  Reflexes: 2+ in upper and lower extremities, downgoing toes bilaterally  Gait: deferred      Medications  aspirin 325 milliGRAM(s) Oral daily  atorvastatin 40 milliGRAM(s) Oral at bedtime  dronedarone 400 milliGRAM(s) Oral two times a day  levothyroxine 100 MICROGram(s) Oral daily  meclizine 25 milliGRAM(s) Oral three times a day PRN  metoclopramide Injectable 5 milliGRAM(s) IV Push every 8 hours PRN  pantoprazole   Suspension 40 milliGRAM(s) Oral daily  rivaroxaban 20 milliGRAM(s) Oral every 24 hours  triamterene 37.5 mG/hydrochlorothiazide 25 mG Capsule 1 Capsule(s) Oral daily      Lab  Basic Metabolic Panel in AM (05.21.18 @ 07:17)    Sodium, Serum: 144 mmol/L    Potassium, Serum: 3.4 mmol/L    Chloride, Serum: 104 mmol/L    Carbon Dioxide, Serum: 27 mmol/L    Anion Gap, Serum: 13 mmol/L    Blood Urea Nitrogen, Serum: 17 mg/dL    Creatinine, Serum: 1.1 mg/dL    Glucose, Serum: 92 mg/dL    Calcium, Total Serum: 8.2 mg/dL     Complete Blood Count + Automated Diff in AM (05.21.18 @ 07:17)    WBC Count: 9.03 K/uL    RBC Count: 3.78 M/uL    Hemoglobin: 11.4 g/dL    Hematocrit: 35.2 %    Mean Cell Volume: 93.1 fL    Mean Cell Hemoglobin: 30.2 pg    Mean Cell Hemoglobin Conc: 32.4 g/dL    Red Cell Distrib Width: 15.3 %    Platelet Count - Automated: 204 K/uL    Auto Neutrophil #: 6.88 K/uL    Auto Lymphocyte #: 1.28 K/uL    Auto Monocyte #: 0.62 K/uL    Auto Eosinophil #: 0.18 K/uL    Auto Basophil #: 0.03 K/uL    Auto Neutrophil %: 76.2: Differential percentages must be correlated with absolute numbers for  clinical significance. %    Auto Lymphocyte %: 14.2 %    Auto Monocyte %: 6.9 %    Auto Eosinophil %: 2.0 %    Auto Basophil %: 0.3 %    Auto Immature Granulocyte %: 0.4 %          Radiology  < from: MR Head No Cont (05.20.18 @ 20:18) >  findings: The ventricles, basal cisterns and sulcal pattern are slightly   prominent consistent with parenchymal volume loss.  There are scattered   punctate foci of T2 and FLAIR hyperintensities in the bilateral frontal   and parietal periventricular and subcortical white matter which are   nonspecific and without mass effect most likely consistent with chronic   small vessel ischemic changes in a patient of this stated age.  There is   no acute mass effect, midline shift or hemorrhage.  No extra axial fluid   collections are identified.    There are no acute infarcts on diffusion weighted images.  Expected   signal flow voids are noted in the major intracranial vessels consistent   with their patency.    Globes and orbits are grossly within normal limits.  The paranasal   sinuses and bilateral mastoid complexes are within normal limits.     There is no bone marrow signal abnormality. The sella is unremarkable.      IMPRESSION:    1.  Scattered T2 and FLAIR hyperintensities bilateral frontal and   parietal periventricular and subcortical white matter which are   nonspecific and without mass effect most likely consistent with chronic   small vessel ischemic changes.    2.  No acute infarcts or intracranial hemorrhage.      < end of copied text >

## 2018-05-22 NOTE — CONSULT NOTE ADULT - SUBJECTIVE AND OBJECTIVE BOX
TEE MATUTE, female, 73y ([birth year]),   [id number]  Admit Date: 05-20 (2d)  CC:      HPI:  73 y.o F w/ hx A Fib s/p ablation at [facility] on 5/7/18 (on xarelto, multaq, metoprolol, lasix), HTN, DL, hypothyroidism, p/w vertigo that started when waking up at 6 am.   pt felt that the room was spinning and her symptoms were worsening with movement, she had nausea and vomiting x 1.   pt denies any motor or sensory deficits, no gait abnormalities, no palpitations, no HA, no visual or auditory changes, no SOB, no CP.    in ED:  / meclizine 25 PO / zofran 4 iv / reglan 10 iv (20 May 2018 13:03)        VS:  T(F): 96.9 (05-22 @ 07:48), Max: 97.7 (05-21 @ 15:43)  HR: 48 (05-22 @ 07:48)  BP: 120/59 (05-22 @ 07:48)  RR: 18 (05-22 @ 07:48)  SpO2: 96% (05-22 @ 07:48)  CAPILLARY BLOOD GLUCOSE        LABS/RAD/MICRO:    (05-21)    144  |  104  |  17  ----------------------------<  92  3.4<L>   |  27  |  1.1                Current Medications:  MEDICATIONS  (STANDING):  aspirin 325 milliGRAM(s) Oral daily  atorvastatin 40 milliGRAM(s) Oral at bedtime  dronedarone 400 milliGRAM(s) Oral two times a day  levothyroxine 100 MICROGram(s) Oral daily  pantoprazole   Suspension 40 milliGRAM(s) Oral daily  rivaroxaban 20 milliGRAM(s) Oral every 24 hours  triamterene 37.5 mG/hydrochlorothiazide 25 mG Capsule 1 Capsule(s) Oral daily    MEDICATIONS  (PRN):  meclizine 25 milliGRAM(s) Oral three times a day PRN Dizziness  metoclopramide Injectable 5 milliGRAM(s) IV Push every 8 hours PRN nausea      PMH/PSH  PAST MEDICAL & SURGICAL HISTORY:  HTN (hypertension)  Hypothyroid  High cholesterol  Afib  S/P ablation of atrial fibrillation TEE MATUTE, female, 73y ([birth year]),   [id number]  Admit Date: 05-20 (2d)  CC:      HPI:  73 y.o F w/ hx A Fib s/p ablation at [facility] on 5/7/18 (on xarelto, multaq, metoprolol, lasix), HTN, DL, hypothyroidism, p/w vertigo that started when waking up at 6 am.   pt felt that the room was spinning and her symptoms were worsening with movement, she had nausea and vomiting x 1.   pt denies any motor or sensory deficits, no gait abnormalities, no palpitations, no HA, no visual or auditory changes, no SOB, no CP.    in ED:  / meclizine 25 PO / zofran 4 iv / reglan 10 iv (20 May 2018 13:03)        VS:  T(F): 96.9 (05-22 @ 07:48), Max: 97.7 (05-21 @ 15:43)  HR: 48 (05-22 @ 07:48)  BP: 120/59 (05-22 @ 07:48)  RR: 18 (05-22 @ 07:48)  SpO2: 96% (05-22 @ 07:48)  CAPILLARY BLOOD GLUCOSE        LABS/RAD/MICRO:    (05-21)    144  |  104  |  17  ----------------------------<  92  3.4<L>   |  27  |  1.1      PHYSICAL EXAM:   Constitutional: non-toxic,  not in acute distress  HEENT: eomi,  wnl  Respiratory:  clear lung sounds b/l;   Cardiovascular:  s1, s2,  regular, no murmurs  Gastrointestinal:  nontender, nondistended, +bowel sounds  Extremities: no edema b/l le  Neurological: nonfocal; wnl, aaox3      Current Medications:  MEDICATIONS  (STANDING):  aspirin 325 milliGRAM(s) Oral daily  atorvastatin 40 milliGRAM(s) Oral at bedtime  dronedarone 400 milliGRAM(s) Oral two times a day  levothyroxine 100 MICROGram(s) Oral daily  pantoprazole   Suspension 40 milliGRAM(s) Oral daily  rivaroxaban 20 milliGRAM(s) Oral every 24 hours  triamterene 37.5 mG/hydrochlorothiazide 25 mG Capsule 1 Capsule(s) Oral daily    MEDICATIONS  (PRN):  meclizine 25 milliGRAM(s) Oral three times a day PRN Dizziness  metoclopramide Injectable 5 milliGRAM(s) IV Push every 8 hours PRN nausea      PMH/PSH  PAST MEDICAL & SURGICAL HISTORY:  HTN (hypertension)  Hypothyroid  High cholesterol  Afib  S/P ablation of atrial fibrillation

## 2018-05-22 NOTE — CONSULT NOTE ADULT - ASSESSMENT
73 y.o F w/ hx A Fib s/p ablation at [facility] on 5/7/18 (on xarelto, multaq, metoprolol, lasix), HTN, DL, hypothyroidism, p/w vertigo of 1 dd.    BRADYCARDIA + ABNORMAL CARDIAC ENZYMES (likely attributable to recent ablation 5/7)  - tele events:    - ekg:  sinus judy @ 43  - tte 5/21:    ef 60 to 65%.   Grade II diastolic dysfunction  Mild MR / AR  Trivial pericardial effusion.  - attending to see 73 y.o F w/ hx A Fib s/p ablation at [facility] on 5/7/18 (on xarelto, multaq, metoprolol, lasix), HTN, DL, hypothyroidism, p/w vertigo x 1d ptp (no chest pain, palpitations, felt ok after ablation until waking up with sudden vertigo 2 days ago;  worse with sudden head movements).      BRADYCARDIA + ABNORMAL CARDIAC ENZYMES (likely attributable to recent ablation 5/7)  - tele events:  none  - on exam,  HR increased from ~50 to ~60 with rapid arm movements in bed.   - ekg:  sinus judy @ 43  - tte 5/21:  ef 60 to 65%.   Grade II diastolic dysfunction  Mild MR / AR  Trivial pericardial effusion.  - no further cardiac work up.  vertigo likely not from cardiac etiology and not d/t bradycardia.   - attending to see 73 y.o F w/ hx A Fib s/p ablation at [facility] on 5/7/18 (on xarelto, multaq, metoprolol, lasix), HTN, DL, hypothyroidism, p/w vertigo x 1d ptp (no chest pain, palpitations, felt ok after ablation until waking up with sudden vertigo 2 days ago;  worse with sudden head movements).      BRADYCARDIA + ABNORMAL CARDIAC ENZYMES (likely attributable to recent ablation 5/7)  - tele events:  none  - on exam,  HR increased from ~50 to ~60 with rapid arm movements in bed.   - ekg:  sinus judy @ 43  - tte 5/21:  ef 60 to 65%.   Grade II diastolic dysfunction  Mild MR / AR  Trivial pericardial effusion.  - no further cardiac work up.  vertigo likely not from cardiac etiology and not d/t bradycardia.   - hold metoprolo conitnue multaq fu for arrythmia in evening. if stable dc home for outpt fu.

## 2018-05-22 NOTE — PROGRESS NOTE ADULT - SUBJECTIVE AND OBJECTIVE BOX
Patient is a 73y old  Female who presents with a chief complaint of vertigo which started suddenly on sunday 5/20 am. no cp, no sob. now still feels the vertigo ( less severe) no fall. no loc. had nausea and vomiting ( non bloody) . no fever.     pt has a hx of afib for which she recieved ablation in [facility] about 2 wks ago. she states that her HR has been running around 457-50 and she has not had any symptoms over the last 2 wks except the new event started on sunday.     Vital Signs Last 24 Hrs  T(C): 36.1 (22 May 2018 07:48), Max: 36.5 (21 May 2018 15:43)  T(F): 96.9 (22 May 2018 07:48), Max: 97.7 (21 May 2018 15:43)  HR: 48 (22 May 2018 07:48) (48 - 66)  BP: 120/59 (22 May 2018 07:48) (111/52 - 139/64)  BP(mean): --  RR: 18 (22 May 2018 07:48) (18 - 18)  SpO2: 96% (22 May 2018 07:48) (96% - 98%)    Physican exam:   constitutional NAD, AAOX3, Respiratory  lungs CTA, CVS heart RRR, GI: abdomen Soft NT, ND, BS+, skin: intact  neuro exam non focal. no nystagmus. gets vertigo on movement of her head to the right.                           11.4   9.03  )-----------( 204      ( 21 May 2018 07:17 )             35.2     05-21    144  |  104  |  17  ----------------------------<  92  3.4<L>   |  27  |  1.1    Ca    8.2<L>      21 May 2018 07:17  Mg     2.2     05-21    < from: MR Head No Cont (05.20.18 @ 20:18) >    1.  Scattered T2 and FLAIR hyperintensities bilateral frontal and   parietal periventricular and subcortical white matter which are   nonspecific and without mass effect most likely consistent with chronic   small vessel ischemic changes.    2.  No acute infarcts or intracranial hemorrhage.    < end of copied text >    A/P  1- vertigo, possible BPV. meclizine prn. needs vestibular physical therapy.     2- abnormal cardiac enzymes, bradycardia , hx afib with recent ablation: cardio eval pending.     3- HTN now controlled. cont meds.

## 2018-05-23 ENCOUNTER — TRANSCRIPTION ENCOUNTER (OUTPATIENT)
Age: 73
End: 2018-05-23

## 2018-05-23 RX ORDER — TRIAMTERENE/HYDROCHLOROTHIAZID 75 MG-50MG
1 TABLET ORAL
Qty: 0 | Refills: 0 | COMMUNITY
Start: 2018-05-23

## 2018-05-23 RX ORDER — MECLIZINE HCL 12.5 MG
1 TABLET ORAL
Qty: 90 | Refills: 0 | OUTPATIENT
Start: 2018-05-23 | End: 2018-06-21

## 2018-05-23 RX ORDER — METOPROLOL TARTRATE 50 MG
1 TABLET ORAL
Qty: 0 | Refills: 0 | COMMUNITY

## 2018-05-23 RX ORDER — FUROSEMIDE 40 MG
0 TABLET ORAL
Qty: 0 | Refills: 0 | COMMUNITY

## 2018-05-23 RX ORDER — TRIAMTERENE 100 MG/1
50 CAPSULE ORAL
Qty: 0 | Refills: 0 | COMMUNITY

## 2018-05-23 RX ADMIN — ATORVASTATIN CALCIUM 40 MILLIGRAM(S): 80 TABLET, FILM COATED ORAL at 21:51

## 2018-05-23 RX ADMIN — Medication 100 MICROGRAM(S): at 06:20

## 2018-05-23 RX ADMIN — DRONEDARONE 400 MILLIGRAM(S): 400 TABLET, FILM COATED ORAL at 17:25

## 2018-05-23 RX ADMIN — RIVAROXABAN 20 MILLIGRAM(S): KIT at 17:25

## 2018-05-23 RX ADMIN — DRONEDARONE 400 MILLIGRAM(S): 400 TABLET, FILM COATED ORAL at 06:19

## 2018-05-23 RX ADMIN — Medication 1 CAPSULE(S): at 06:20

## 2018-05-23 NOTE — CONSULT NOTE ADULT - SUBJECTIVE AND OBJECTIVE BOX
HPI:  73 y.o F w/ hx A Fib s/p ablation at [facility] on 5/7/18 (on xarelto, multaq, metoprolol, lasix), HTN, DL, hypothyroidism, p/w vertigo that started when waking up at 6 am.   pt felt that the room was spinning and her symptoms were worsening with movement, she had nausea and vomiting x 1.   pt denies any motor or sensory deficits, no gait abnormalities, no palpitations, no HA, no visual or auditory changes, no SOB, no CP.    in ED:  / meclizine 25 PO / zofran 4 iv / reglan 10 iv (20 May 2018 13:03)      PAST MEDICAL & SURGICAL HISTORY:  HTN (hypertension)  Hypothyroid  High cholesterol  Afib  S/P ablation of atrial fibrillation      Hospital Course:    TODAY'S SUBJECTIVE & REVIEW OF SYMPTOMS:     Constitutional WNL   Cardio WNL   Resp WNL   GI WNL  Heme WNL  Endo WNL  Skin WNL  MSK WNL  Neuro dizzy  Cognitive WNL  Psych WNL      MEDICATIONS  (STANDING):  atorvastatin 40 milliGRAM(s) Oral at bedtime  dronedarone 400 milliGRAM(s) Oral two times a day  levothyroxine 100 MICROGram(s) Oral daily  rivaroxaban 20 milliGRAM(s) Oral every 24 hours  triamterene 37.5 mG/hydrochlorothiazide 25 mG Capsule 1 Capsule(s) Oral daily    MEDICATIONS  (PRN):  meclizine 25 milliGRAM(s) Oral three times a day PRN Dizziness      FAMILY HISTORY:      Allergies    amoxicillin (Unknown)  penicillin (Unknown)    Intolerances        SOCIAL HISTORY:    [  ] Etoh  [  ] Smoking  [  ] Substance abuse     Home Environment:  [  ] Home Alone  [x  ] Lives with Family  [  ] Home Health Aid    Dwelling:  [  ] Apartment  [x  ] Private House  [  ] Adult Home  [  ] Skilled Nursing Facility      [  ] Short Term  [  ] Long Term  [  ] Stairs       Elevator [  ]    FUNCTIONAL STATUS PTA: (Check all that apply)  Ambulation: [ x  ]Independent    [  ] Dependent     [  ] Non-Ambulatory  Assistive Device: [  ] SA Cane  [  ]  Q Cane  [  ] Walker  [  ]  Wheelchair  ADL : [x  ] Independent  [  ]  Dependent       Vital Signs Last 24 Hrs  T(C): 36.1 (23 May 2018 12:57), Max: 36.3 (22 May 2018 22:14)  T(F): 97 (23 May 2018 12:57), Max: 97.3 (22 May 2018 22:14)  HR: 47 (22 May 2018 22:14) (47 - 53)  BP: 151/66 (22 May 2018 22:14) (130/62 - 151/66)  BP(mean): --  RR: 20 (23 May 2018 12:57) (18 - 20)  SpO2: 95% (23 May 2018 08:29) (95% - 97%)      PHYSICAL EXAM: Alert & Oriented X3  GENERAL: NAD, well-groomed, well-developed  HEAD:  Atraumatic, Normocephalic  CHEST/LUNG: Clear to percussion bilaterally; No rales, rhonchi, wheezing, or rubs  HEART: Regular rate and rhythm  ABDOMEN: Soft, Nontender, Nondistended; Bowel sounds present  EXTREMITIES:  2+ Peripheral Pulses, No clubbing, cyanosis, or edema    NERVOUS SYSTEM:  Cranial Nerves 2-12 intact [  ] Abnormal  [  ]  ROM: WFL all extremities [ x ]  Abnormal [  ]  Motor Strength: WFL all extremities  [ x ]  Abnormal [  ]  Sensation: intact to light touch [x  ] Abnormal [  ]  Reflexes: Symmetric [  ]  Abnormal [  ]    FUNCTIONAL STATUS:  Bed Mobility: Independent [  ]  Supervision [x  ]  Needs Assistance [  ]  N/A [  ]  Transfers: Independent [  ]  Supervision [  ]  Needs Assistance [x  ]  N/A [  ]   Ambulation: Independent [  ]  Supervision [  ]  Needs Assistance [ x ]  N/A [  ]  ADL: Independent [  ] Requires Assistance [  ] N/A [  ]      LABS:                RADIOLOGY & ADDITIONAL STUDIES:    Assesment:

## 2018-05-23 NOTE — DISCHARGE NOTE ADULT - HOSPITAL COURSE
73 y.o F admitted for evaluation of acute onset vertigo. PMhx of A Fib s/p ablation at [facility] on 2 weeks ptp (on xarelto, multaq, metoprolol, lasix), HTN, DL, hypothyroidism. Presenting sx were room spinning of 1 dd described as present when she woke from sleep worse with head movement. Also this admission patient had minimal tropoinins 0.04--> 0.03 and bradycardia. MRI head negative for acute stroke, 2D echo normal EF and grade II diastolic dysfunction otherwise unremarkable. cardiology dc'd metoprolol for asymtommatic bradycardia and continued multaq. Orthostatics negative. With rest patient's symptoms improved but were still present at discharge.    pt dc'd with instructions to follow up with ENT if Sx not resolved with rest, meclizine, and epley maneuvers. Pt unstead with ambulation but not PT rehab candidate as not related to strength so dc'd home with rolling walker. 73 y.o F admitted for evaluation of acute onset vertigo. PMhx of A Fib s/p ablation at [facility] on 2 weeks ptp (on xarelto, multaq, metoprolol, lasix), HTN, DL, hypothyroidism. Presenting sx were room spinning of 1 dd described as present when she woke from sleep worse with head movement. Also this admission patient had minimal tropoinins 0.04--> 0.03 and bradycardia. MRI head negative for acute stroke, 2D echo normal EF and grade II diastolic dysfunction otherwise unremarkable. cardiology dc'd metoprolol for asymtommatic bradycardia and continued multaq. Orthostatics negative. With rest patient's symptoms improved but were still present at discharge.    pt dc'd with instructions to follow up with ENT if Sx not resolved with rest, meclizine, and epley maneuvers. Pt unsteady with ambulation but not PT rehab candidate as not related to strength so dc'd home with rolling walker.

## 2018-05-23 NOTE — PROGRESS NOTE ADULT - ASSESSMENT
73 y.o F w/ hx A Fib s/p ablation at [facility] on 5/7/18 (on xarelto, multaq, metoprolol, lasix), HTN, DL, hypothyroidism, p/w vertigo x 1d ptp:    Vertigo: likely BPPV. improved. MRI brain neg OPT follow up for vestibular rehab     HTN: stable     Afib. s/p ablation: in sinus. per cardiology no further intervention for sinus bradycardia just to stop BB  on xarelto     discharge home today     spent 35 minutes on discharge 73 y.o F w/ hx A Fib s/p ablation at [facility] on 5/7/18 (on xarelto, multaq, metoprolol, lasix), HTN, DL, hypothyroidism, p/w vertigo x 1d ptp:    Vertigo: likely BPPV. improved. MRI brain neg OPT follow up for vestibular rehab     HTN: stable     Afib. s/p ablation: in sinus. per cardiology no further intervention for sinus bradycardia just to stop BB  on xarelto     needs pt rehab eval

## 2018-05-23 NOTE — DISCHARGE NOTE ADULT - PLAN OF CARE
Management and resolution of symptoms You are experiencing symptoms consistent with vertigo. Well vertigo can be an unpleasant feeling it is usually not dangerous, though in rare cases it can be signs of something severe which is why you were admitted. When you were admitted we performed an MRI in your brain to see if you had a stroke that might have caused the symptoms, but your MRI brain was negative for a stroke. Your vertigo symptoms are likely due to either benign paroxysmal positional vertigo or from labyrinthitis.   You should continue taking meclizine as needed. We performed procedures called Epley Maneuvers with you, while you were in the hospital. If these Maneuvers helped you should continue to do them at home. There are many good instructional videos on YouTube and you have been provided with a printout to guide you through the maneuvers. If you still have symptoms in 2-3 days after discharge you should follow up with an ear nose and throat doctor AND seek treatment with our vertigo rehabilitation center. prevent complications Your heart rate is slow this admission which is why your metoprolol was discontinued. Please follow up with heart doctor as an outpatient to monitor your heart rate. You should invest in a blood pressure machine to monitor your blood pressure and heart rate. You should keep a log of your blood pressure and heart rate every morning before you take your medications and bring this log to your doctors. If you are experiencing symptoms that concern you it is important to check your blood pressure in heart rate at that time and seek medical attention.

## 2018-05-23 NOTE — DISCHARGE NOTE ADULT - CARE PROVIDER_API CALL
David Lyn), Internal Medicine  [address] [place] [address]  Phone: [phone]  Fax: [phone]    [facility],   [phone]  [address] [place] [address]  Phone: (   )    -  Fax: (   )    - David Lyn), Internal Medicine  [address] [place] [address]  Phone: [phone]  Fax: [phone]    [facility],   [facility],   [phone]  [address] [place] [address]  Phone: (   )    -  Fax: (   )    -  Phone: (   )    -  Fax: (   )    -

## 2018-05-23 NOTE — DISCHARGE NOTE ADULT - NS AS ACTIVITY OBS
Please walk with a walker, do not operate heavy machinery/ drive a vehicle if you have taken meclizine within 8 hours or feel vertiginous

## 2018-05-23 NOTE — PROGRESS NOTE ADULT - SUBJECTIVE AND OBJECTIVE BOX
no chest pain and no sob     Vital Signs Last 24 Hrs  T(C): 36.3 (23 May 2018 05:34), Max: 36.3 (22 May 2018 22:14)  T(F): 97.3 (23 May 2018 05:34), Max: 97.3 (22 May 2018 22:14)  HR: 47 (22 May 2018 22:14) (47 - 53)  BP: 151/66 (22 May 2018 22:14) (130/62 - 151/66)  BP(mean): --  RR: 18 (23 May 2018 05:34) (18 - 18)  SpO2: 95% (23 May 2018 08:29) (95% - 97%)    PHYSICAL EXAM:  GENERAL: NAD, well-developed  HEAD:  Atraumatic, Normocephalic  EYES: EOMI, PERRLA, conjunctiva and sclera clear  NECK: Supple, No JVD  CHEST/LUNG: Clear to auscultation bilaterally; No wheeze  HEART: Regular rate and rhythm; No murmurs, rubs, or gallops  ABDOMEN: Soft, Nontender, Nondistended; Bowel sounds present  EXTREMITIES:  2+ Peripheral Pulses, No clubbing, cyanosis, or edema  PSYCH: AAOx3  NEUROLOGY: non-focal  SKIN: No rashes or lesions

## 2018-05-23 NOTE — PROGRESS NOTE ADULT - SUBJECTIVE AND OBJECTIVE BOX
overnight EVENTS: pT FEELING BETTER EVERY DAY, MECLIZINE Helping. Epley maneuvers performed with patient and patient stated feeling even less dizzy though still unsteady pending PT eval. Pt came to see patient though patient did not have activity orders and intern was not informed.    MEDICATIONS  (STANDING):  atorvastatin 40 milliGRAM(s) Oral at bedtime  dronedarone 400 milliGRAM(s) Oral two times a day  levothyroxine 100 MICROGram(s) Oral daily  rivaroxaban 20 milliGRAM(s) Oral every 24 hours  triamterene 37.5 mG/hydrochlorothiazide 25 mG Capsule 1 Capsule(s) Oral daily    MEDICATIONS  (PRN):  meclizine 25 milliGRAM(s) Oral three times a day PRN Dizziness    T(F): , Max: 97.3 (22:14)  HR:  (47 - 53)  BP:  (130/62 - 151/66)  RR:  (18 - 20)  SpO2:  (95% - 97%)  GEN: NAD  Cards: S1 S2 judy  Lungs: Clear  LE no swelling  Pt able to go from sitting to standing with minimal effort, ambulation not witnessed    No labs from today not ncessary    #Pt remains bradycardic

## 2018-05-23 NOTE — DISCHARGE NOTE ADULT - CARE PLAN
Principal Discharge DX:	Vertigo  Goal:	Management and resolution of symptoms  Assessment and plan of treatment:	You are experiencing symptoms consistent with vertigo. Well vertigo can be an unpleasant feeling it is usually not dangerous, though in rare cases it can be signs of something severe which is why you were admitted. When you were admitted we performed an MRI in your brain to see if you had a stroke that might have caused the symptoms, but your MRI brain was negative for a stroke. Your vertigo symptoms are likely due to either benign paroxysmal positional vertigo or from labyrinthitis.   You should continue taking meclizine as needed. We performed procedures called Epley Maneuvers with you, while you were in the hospital. If these Maneuvers helped you should continue to do them at home. There are many good instructional videos on YouTube and you have been provided with a printout to guide you through the maneuvers. If you still have symptoms in 2-3 days after discharge you should follow up with an ear nose and throat doctor AND seek treatment with our vertigo rehabilitation center.  Secondary Diagnosis:	Bradycardia  Goal:	prevent complications  Assessment and plan of treatment:	Your heart rate is slow this admission which is why your metoprolol was discontinued. Please follow up with heart doctor as an outpatient to monitor your heart rate. You should invest in a blood pressure machine to monitor your blood pressure and heart rate. You should keep a log of your blood pressure and heart rate every morning before you take your medications and bring this log to your doctors. If you are experiencing symptoms that concern you it is important to check your blood pressure in heart rate at that time and seek medical attention.

## 2018-05-23 NOTE — CONSULT NOTE ADULT - ASSESSMENT

## 2018-05-23 NOTE — DISCHARGE NOTE ADULT - MEDICATION SUMMARY - MEDICATIONS TO TAKE
I will START or STAY ON the medications listed below when I get home from the hospital:    Multaq 400 mg oral tablet  -- 1 tab(s) by mouth 2 times a day  -- Indication: For Atrial Fibrillation    Xarelto 20 mg oral tablet  -- 1 tab(s) by mouth once a day (in the evening)  -- Indication: For Atrial Fibrillation    atorvastatin 40 mg oral tablet  -- Indication: For High cholesterol    hydrochlorothiazide-triamterene 25 mg-37.5 mg oral capsule  -- 1 cap(s) by mouth once a day  -- Indication: For Blood Pressure    pantoprazole  -- 40 milligram(s) by mouth once a day  -- Indication: For Stomach protection    Synthroid 100 mcg (0.1 mg) oral tablet  -- 1 tab(s) by mouth once a day  -- Indication: For Low thyroid

## 2018-05-23 NOTE — PROGRESS NOTE ADULT - ASSESSMENT
73 y.o F admitted for evaluation of acute onset vertigo. PMhx of A Fib s/p ablation at [facility] on 2 weeks ptp (on xarelto, multaq, metoprolol, lasix), HTN, DL, hypothyroidism. Presenting sx were room spinning of 1 dd described as present when she woke from sleep worse with head movement. Also this admission patient had minimal tropoinins 0.04--> 0.03 and bradycardia.    # vertigo likely BPPV vs vestibulitis  - MRI brain chronic small vascular changes only  - echo unremarkable  -c/w meclizine prn, epley maneuvers (educated patient and demonstrated)  - PT rehab as unsteady and on AC    # a. fib s/p ablation  - on tele-->sinus bradycardia--> BB held  - cardio eval pending  - c/w multaq for now - if pt remains bradycardic, consider holding, c/w xarelto    # HTN / DL / hypothyroidism: c/w home meds  # DVT ppx: on xarelto  # full code    # Disposition plan to home-->may require home care services as pt feels wobbly when walking around and has stairs at home--> PT evaluation  ---> a/c for d/c home tomorrow likely w/ [facility] after PT evaluation

## 2018-05-23 NOTE — DISCHARGE NOTE ADULT - PATIENT PORTAL LINK FT
You can access the [facility] Portal, offered by [facility], by registering with the following website: [email or web address]

## 2018-05-23 NOTE — DISCHARGE NOTE ADULT - PROVIDER TOKENS
TOKEN:'[id number]',FREE:[LAST:[[facility]],PHONE:[(   )    -],FAX:[(   )    -],ADDRESS:[phone] [address] [address]]] TOKEN:'[id number]',FREE:[LAST:[[facility]],PHONE:[(   )    -],FAX:[(   )    -],ADDRESS:[[facility],   [phone]  [address] [place] [address]  Phone: (   )    -  Fax: (   )    -]]

## 2018-05-23 NOTE — PHYSICAL THERAPY INITIAL EVALUATION ADULT - SPECIFY REASON(S)
Chart reviewed. Pt. without activity orders at this time. PT evaluation on hold. Will follow when medically appropriate for OOB activity.

## 2018-05-23 NOTE — DISCHARGE NOTE ADULT - ADDITIONAL INSTRUCTIONS
All Medical Records you would like are available to you at:  [facility]: [phone]  Please bring your medications (bottles and all) with your to your doctors appointments over the next month

## 2018-05-24 VITALS — WEIGHT: 187.61 LBS | TEMPERATURE: 97 F | RESPIRATION RATE: 18 BRPM

## 2018-05-24 RX ADMIN — Medication 100 MICROGRAM(S): at 05:25

## 2018-05-24 RX ADMIN — Medication 1 CAPSULE(S): at 05:25

## 2018-05-24 RX ADMIN — DRONEDARONE 400 MILLIGRAM(S): 400 TABLET, FILM COATED ORAL at 05:25

## 2018-05-24 NOTE — PHYSICAL THERAPY INITIAL EVALUATION ADULT - CRITERIA FOR SKILLED THERAPEUTIC INTERVENTIONS
therapy frequency/anticipated equipment needs at discharge/risk reduction/prevention/rehab potential/impairments found/functional limitations in following categories

## 2018-05-24 NOTE — PROGRESS NOTE ADULT - ASSESSMENT
73 y.o F w/ hx A Fib s/p ablation at [facility] on 5/7/18 (on xarelto, multaq, metoprolol, lasix), HTN, DL, hypothyroidism, p/w vertigo x 1d ptp:    Vertigo better: likely BPPV. improved. MRI brain neg OPT follow up for vestibular rehab     HTN: stable     Afib. s/p ablation: in sinus. per cardiology no further intervention for sinus bradycardia just to stop BB  on xarelto     will be discharged home today likely after PT beto   spent 35 minutes on discharge

## 2018-05-24 NOTE — PROGRESS NOTE ADULT - SUBJECTIVE AND OBJECTIVE BOX
no chest pain and no sob   dizziness better     Vital Signs Last 24 Hrs  T(C): 36.3 (24 May 2018 05:38), Max: 36.3 (24 May 2018 05:38)  T(F): 97.3 (24 May 2018 05:38), Max: 97.3 (24 May 2018 05:38)  HR: 49 (23 May 2018 20:22) (49 - 49)  BP: 126/60 (23 May 2018 20:22) (126/60 - 126/60)  BP(mean): --  RR: 18 (24 May 2018 05:38) (18 - 20)  SpO2: 95% (23 May 2018 20:30) (95% - 95%)    PHYSICAL EXAM:  GENERAL: NAD, well-developed  HEAD:  Atraumatic, Normocephalic  EYES: EOMI, PERRLA, conjunctiva and sclera clear  NECK: Supple, No JVD  Pulm: Clear to auscultation bilaterally; No wheeze  CV: Regular rate and rhythm; No murmurs, rubs, or gallops  GI: Soft, Nontender, Nondistended; Bowel sounds present  EXTREMITIES:  2+ Peripheral Pulses, No clubbing, cyanosis, or edema  PSYCH: AAOx3  NEUROLOGY: non-focal  SKIN: No rashes or lesions

## 2018-05-24 NOTE — PHYSICAL THERAPY INITIAL EVALUATION ADULT - TRANSFER SAFETY CONCERNS NOTED: SIT/STAND, REHAB EVAL
pt unable to look down, upon doing so she reports she becomes wobbly. Thus, pt constantly looked up and was directed by PT./decreased safety awareness

## 2018-05-24 NOTE — PHYSICAL THERAPY INITIAL EVALUATION ADULT - ADDITIONAL COMMENTS
Pt has 3 steps to enter / 12 steps to bedroom. Prior to admission, pt used no AD. Discussed safety with pt, how to promote safety at home. Pt in good understanding to have 24 hour supervision with ambulation/ mobility.

## 2018-05-25 DIAGNOSIS — N18.3 CHRONIC KIDNEY DISEASE, STAGE 3 (MODERATE): ICD-10-CM

## 2018-05-25 DIAGNOSIS — E03.9 HYPOTHYROIDISM, UNSPECIFIED: ICD-10-CM

## 2018-05-25 DIAGNOSIS — I12.9 HYPERTENSIVE CHRONIC KIDNEY DISEASE WITH STAGE 1 THROUGH STAGE 4 CHRONIC KIDNEY DISEASE, OR UNSPECIFIED CHRONIC KIDNEY DISEASE: ICD-10-CM

## 2018-05-25 DIAGNOSIS — R74.8 ABNORMAL LEVELS OF OTHER SERUM ENZYMES: ICD-10-CM

## 2018-05-25 DIAGNOSIS — R42 DIZZINESS AND GIDDINESS: ICD-10-CM

## 2018-05-25 DIAGNOSIS — R00.1 BRADYCARDIA, UNSPECIFIED: ICD-10-CM

## 2018-05-25 DIAGNOSIS — H81.10 BENIGN PAROXYSMAL VERTIGO, UNSPECIFIED EAR: ICD-10-CM

## 2018-05-25 DIAGNOSIS — I48.91 UNSPECIFIED ATRIAL FIBRILLATION: ICD-10-CM

## 2018-07-23 ENCOUNTER — EMERGENCY (EMERGENCY)
Facility: HOSPITAL | Age: 73
LOS: 0 days | Discharge: HOME | End: 2018-07-23
Attending: EMERGENCY MEDICINE | Admitting: EMERGENCY MEDICINE

## 2018-07-23 VITALS
TEMPERATURE: 97 F | RESPIRATION RATE: 18 BRPM | DIASTOLIC BLOOD PRESSURE: 72 MMHG | HEART RATE: 68 BPM | SYSTOLIC BLOOD PRESSURE: 162 MMHG | OXYGEN SATURATION: 99 %

## 2018-07-23 VITALS — WEIGHT: 177.91 LBS

## 2018-07-23 DIAGNOSIS — M23.91 UNSPECIFIED INTERNAL DERANGEMENT OF RIGHT KNEE: ICD-10-CM

## 2018-07-23 DIAGNOSIS — Z88.0 ALLERGY STATUS TO PENICILLIN: ICD-10-CM

## 2018-07-23 DIAGNOSIS — Z79.899 OTHER LONG TERM (CURRENT) DRUG THERAPY: ICD-10-CM

## 2018-07-23 DIAGNOSIS — Z98.890 OTHER SPECIFIED POSTPROCEDURAL STATES: Chronic | ICD-10-CM

## 2018-07-23 DIAGNOSIS — M25.561 PAIN IN RIGHT KNEE: ICD-10-CM

## 2018-07-23 RX ORDER — IBUPROFEN 200 MG
600 TABLET ORAL ONCE
Qty: 0 | Refills: 0 | Status: COMPLETED | OUTPATIENT
Start: 2018-07-23 | End: 2018-07-23

## 2018-07-23 RX ADMIN — Medication 600 MILLIGRAM(S): at 20:35

## 2018-07-23 NOTE — ED PROVIDER NOTE - NS ED ROS FT
MS:  Right knee pain. No ankle pain. No hip pain. No swelling of right knee.   Neuro:  No paresthesias.   Skin:  No erythema.

## 2018-07-23 NOTE — ED PROVIDER NOTE - PHYSICAL EXAMINATION
MSK: Tenderness to palpation of the posterolateral aspect of right knee. Anterior drawer test is normal. Posterior drawer test is normal. Mild tenderness elicited with lateral traction of leg. No tenderness palpation of the right ankle, right humerus, right hip.   Vascular: DP pulses 2+ bilaterally.  Neuro: sensation to light touch intact.

## 2018-07-23 NOTE — ED PROVIDER NOTE - ATTENDING CONTRIBUTION TO CARE
Pt is a 74yo female with hx of intermittent pain to R knee in certain positions who twisted jher knee standing up and feels pain along lateral aspect with inability to bear weight.  No radiation of pain.  No swelling, no other complaints.    Exam: FROM of R knee, no bony tenderness, no ligamentous laxity, neg Callie's, no effusion, 5/5 knee extension and flexion  Imp: ?ligamentous injury  Plan: imaging, splint, crutches, ortho f/u

## 2018-07-23 NOTE — ED PROVIDER NOTE - OBJECTIVE STATEMENT
Patient is a 74 yo F p/w right knee pain x 3 hours. Patient internally rotated her right knee, felt immediate pain to the posterolateral aspect of right knee. Denies hearing clicks. Denies paresthesias, denies swelling. Cannot bear weight.

## 2018-07-24 PROBLEM — E78.00 PURE HYPERCHOLESTEROLEMIA, UNSPECIFIED: Chronic | Status: ACTIVE | Noted: 2018-05-20

## 2018-07-24 PROBLEM — I10 ESSENTIAL (PRIMARY) HYPERTENSION: Chronic | Status: ACTIVE | Noted: 2018-05-20

## 2018-07-24 PROBLEM — E03.9 HYPOTHYROIDISM, UNSPECIFIED: Chronic | Status: ACTIVE | Noted: 2018-05-20

## 2018-07-24 PROBLEM — I48.91 UNSPECIFIED ATRIAL FIBRILLATION: Chronic | Status: ACTIVE | Noted: 2018-05-20

## 2021-10-06 PROBLEM — I10 ESSENTIAL HYPERTENSION: Status: ACTIVE | Noted: 2017-07-20

## 2024-04-28 NOTE — ED ADULT NURSE NOTE - PATIENT DISCHARGE SIGNATURE
23-Jul-2018
h/o smoking, copd, little  in no respiratory distress w adequate spo2 at room air  in mild respiratory distress with adequate spo2 on 2 LPM via NC  ct iimaging reviewed; showing emphysema, rul ggo ~2.5 cm + l adrenal mass ~4 cm  follow up d-dimer, procalcitonin  Monitor SpO2, RR, for signs of respiratory distress; Goal SpO2 >88-92%, PaO2 >55-60 mmHg   bronchodilators + oxygen supplementation as needed to maintain goal  aggressive pulmonary toilet/hygiene    aspiration precautions with head end of bed elevated  symptomatic relief with antitussives, mucolytics, antipyretics as needed  pulm + medonc consult in am

## 2025-07-29 NOTE — ED ADULT NURSE REASSESSMENT NOTE - NS ED NURSE REASSESS COMMENT FT1
Lexiscan Stress: Pt tolerated well. VSS. Pt denied chest pain or pressure prior to injection. After injection Pt only had slight pressure to head. No chest pain or SOB expressed.    1025 Pt transferred back to Rm 5522 per w/c. Detailed report called to RN.    MDx [id number] made aware patient is Jesse at 40 HR and asymptomatic. MD evaluated patient at bed side.  As per MD patient ok to go off tele for MRI and will place order